# Patient Record
Sex: FEMALE | Race: WHITE | NOT HISPANIC OR LATINO
[De-identification: names, ages, dates, MRNs, and addresses within clinical notes are randomized per-mention and may not be internally consistent; named-entity substitution may affect disease eponyms.]

---

## 2020-06-29 PROBLEM — Z00.00 ENCOUNTER FOR PREVENTIVE HEALTH EXAMINATION: Status: ACTIVE | Noted: 2020-06-29

## 2020-07-02 ENCOUNTER — APPOINTMENT (OUTPATIENT)
Dept: GASTROENTEROLOGY | Facility: CLINIC | Age: 60
End: 2020-07-02
Payer: COMMERCIAL

## 2020-07-02 VITALS
SYSTOLIC BLOOD PRESSURE: 122 MMHG | WEIGHT: 118 LBS | TEMPERATURE: 98.4 F | BODY MASS INDEX: 21.71 KG/M2 | DIASTOLIC BLOOD PRESSURE: 74 MMHG | HEIGHT: 62 IN

## 2020-07-02 DIAGNOSIS — Z72.89 OTHER PROBLEMS RELATED TO LIFESTYLE: ICD-10-CM

## 2020-07-02 DIAGNOSIS — Z87.891 PERSONAL HISTORY OF NICOTINE DEPENDENCE: ICD-10-CM

## 2020-07-02 DIAGNOSIS — Z80.0 FAMILY HISTORY OF MALIGNANT NEOPLASM OF DIGESTIVE ORGANS: ICD-10-CM

## 2020-07-02 PROCEDURE — 99213 OFFICE O/P EST LOW 20 MIN: CPT

## 2020-07-02 NOTE — HISTORY OF PRESENT ILLNESS
[FreeTextEntry1] : patient a retired , is here to discuss having a colonoscopy.\par \par Mom had colon polyp, malignant, and it was managed colonoscopically and followed up without recurrence..\par \par for this patient has been followed, and her three daughters have been followed with periodic colonoscopy\par as For Joslyn, she has been advised to have colons every three to five years.\par \par patient;s two sisters have had neg colonoscopies..\par \par normal bowel function,\par \par sometimes stools are marble like,  slight straining ...

## 2020-07-14 ENCOUNTER — APPOINTMENT (OUTPATIENT)
Dept: GASTROENTEROLOGY | Facility: CLINIC | Age: 60
End: 2020-07-14
Payer: COMMERCIAL

## 2020-07-14 VITALS
SYSTOLIC BLOOD PRESSURE: 104 MMHG | WEIGHT: 118 LBS | HEART RATE: 63 BPM | BODY MASS INDEX: 21.71 KG/M2 | OXYGEN SATURATION: 98 % | DIASTOLIC BLOOD PRESSURE: 70 MMHG | HEIGHT: 62 IN | TEMPERATURE: 98.4 F

## 2020-07-14 DIAGNOSIS — D50.9 IRON DEFICIENCY ANEMIA, UNSPECIFIED: ICD-10-CM

## 2020-07-14 DIAGNOSIS — Z80.0 FAMILY HISTORY OF MALIGNANT NEOPLASM OF DIGESTIVE ORGANS: ICD-10-CM

## 2020-07-14 DIAGNOSIS — K59.09 OTHER CONSTIPATION: ICD-10-CM

## 2020-07-14 PROCEDURE — 99214 OFFICE O/P EST MOD 30 MIN: CPT

## 2020-07-14 NOTE — ASSESSMENT
[FreeTextEntry1] : 1.  CHANGE IN BOWEL FUNCTION RECENTLY, WITH HARDER STOOL, DIFFICULTY PASSING STOOL, MARBLE SHAPED STOOLS,\par 2.  PATIENT HAS BEEN RECENTLY TOLD SHE WAS IRON DEFICIENT, AND DR HALEY WANTED HER TO DO HEMOCCULT SLIDES, BUT MAGUI WAS UNABLE TO OBTAIN A SPECIMEN\par 3.  WE NEED TO DO COLONOSCOPY

## 2020-07-14 NOTE — PHYSICAL EXAM
[General Appearance - In No Acute Distress] : in no acute distress [General Appearance - Alert] : alert [Abdomen Soft] : soft [Abdomen Mass (___ Cm)] : no abdominal mass palpated [Abdomen Tenderness] : non-tender [] : no hepato-splenomegaly [Normal Sphincter Tone] : normal sphincter tone [No Rectal Mass] : no rectal mass [Internal Hemorrhoid] : no internal hemorrhoids [External Hemorrhoid] : no external hemorrhoids [FreeTextEntry1] : NO STOOL IN RECTAL AMPULLA

## 2020-07-14 NOTE — HISTORY OF PRESENT ILLNESS
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

## 2020-07-20 ENCOUNTER — RESULT REVIEW (OUTPATIENT)
Age: 60
End: 2020-07-20

## 2020-07-22 ENCOUNTER — RESULT REVIEW (OUTPATIENT)
Age: 60
End: 2020-07-22

## 2020-07-23 ENCOUNTER — APPOINTMENT (OUTPATIENT)
Dept: GASTROENTEROLOGY | Facility: HOSPITAL | Age: 60
End: 2020-07-23

## 2020-08-07 ENCOUNTER — RESULT CHARGE (OUTPATIENT)
Age: 60
End: 2020-08-07

## 2020-08-10 LAB
DATE COLLECTED: NORMAL
HEMOCCULT 2: NEGATIVE
HEMOCCULT 3: NEGATIVE
HEMOCCULT SP1 STL QL: NEGATIVE
QUALITY CONTROL: YES

## 2020-09-01 ENCOUNTER — APPOINTMENT (OUTPATIENT)
Dept: GASTROENTEROLOGY | Facility: CLINIC | Age: 60
End: 2020-09-01

## 2020-10-23 ENCOUNTER — APPOINTMENT (OUTPATIENT)
Dept: GASTROENTEROLOGY | Facility: CLINIC | Age: 60
End: 2020-10-23

## 2023-03-07 ENCOUNTER — APPOINTMENT (OUTPATIENT)
Dept: SURGERY | Facility: CLINIC | Age: 63
End: 2023-03-07
Payer: COMMERCIAL

## 2023-03-07 ENCOUNTER — NON-APPOINTMENT (OUTPATIENT)
Age: 63
End: 2023-03-07

## 2023-03-07 ENCOUNTER — APPOINTMENT (OUTPATIENT)
Dept: SURGERY | Facility: CLINIC | Age: 63
End: 2023-03-07

## 2023-03-07 VITALS
DIASTOLIC BLOOD PRESSURE: 60 MMHG | BODY MASS INDEX: 21.16 KG/M2 | OXYGEN SATURATION: 98 % | WEIGHT: 115 LBS | HEIGHT: 62 IN | HEART RATE: 68 BPM | RESPIRATION RATE: 18 BRPM | SYSTOLIC BLOOD PRESSURE: 98 MMHG

## 2023-03-07 PROCEDURE — 99203 OFFICE O/P NEW LOW 30 MIN: CPT | Mod: 25

## 2023-03-07 PROCEDURE — 46600 DIAGNOSTIC ANOSCOPY SPX: CPT

## 2023-03-07 RX ORDER — FLUOXETINE HYDROCHLORIDE 20 MG/1
20 CAPSULE ORAL
Refills: 0 | Status: DISCONTINUED | COMMUNITY
End: 2021-03-07

## 2023-03-07 NOTE — ASSESSMENT
[FreeTextEntry1] : 62-year-old female with full-thickness rectal prolapse. \par \par  Patient currently has good bowel function without evidence of fecal incontinence.  No evidence of anterior compartment prolapse.  Urinary incontinence treated with bulking injection.  Some evidence of delayed colonic motility with 1 bowel movement per week.  Recommend patient follow-up with Dr. Mckeon to discuss management of colonic motility and constipation.  Discussed operative approaches to repairing rectal prolapse including both abdominal and perineal approaches.  Given patient is young and otherwise healthy would recommend an abdominal approach with robotic assisted suture rectopexy versus ventral mesh rectopexy.  Discussed the potential risks and benefits of all approaches.  Recommend patient complete further work-up with Dr. Mckeon and then we can discuss further the best surgical options for her.\par \par Recommend follow-up after seeing Dr. Mckeon and having her colonoscopy completed.

## 2023-03-07 NOTE — HISTORY OF PRESENT ILLNESS
[FreeTextEntry1] : 62-year-old female here for initial evaluation for rectal prolapse.  Patient previously known to Dr. Mckeon.  She was recently seen in the ER 2 weeks ago for recurrent symptoms of something coming out with bowel movements.  She was at home she felt something prolapse that would not go back and which is what prompted her trip to the ER.  It was reduced in the ER and she was told that she had rectal prolapse.  No prior surgeries for prolapse.  No prior history of pregnancy or childbirth.  Patient has longstanding history of constipation with bowel movements once a week although she does not feel that she has significant straining.  She has never taken any medications or stool softeners to aid in bowel movements.  She is only taken some figs or prunes in the past.  She has previously had a colonoscopy with Dr. Mckeon in 2020 and is due for one again this summer.  She currently has follow-up scheduled with him in April. \par \par Medical history significant for iron deficiency anemia.  Patient previously treated for urinary incontinence with an injectable bulking agent.  Subsequently had some difficulty with urination but this is improved with time.  No issues with fecal incontinence.  No sensation of vaginal prolapse

## 2023-03-07 NOTE — PHYSICAL EXAM
[Abdomen Masses] : No abdominal masses [Abdomen Tenderness] : ~T No ~M abdominal tenderness [Normal rectal exam] : exam was normal [Excoriation] : no perianal excoriation [Tender, Swollen] : nontender, non-swollen [Skin Tags] : there were no residual hemorrhoidal skin tags seen [None] : there was no rectal abscess [JVD] : no jugular venous distention  [Respiratory Effort] : normal respiratory effort [No Rash or Lesion] : No rash or lesion [Calm] : calm [de-identified] : Sphincter tone slightly lax [de-identified] : No acute distress [FreeTextEntry1] : Patient asked to sit on the toilet and straining.  When she felt that the prolapse was out full-thickness, prolapse was visualized and appreciated.

## 2023-03-07 NOTE — PROCEDURE
[FreeTextEntry1] : Anoscopy: Anoscopic exam performed with lighted anoscope.  Internal hemorrhoids relatively flat without any active bleeding.  No other anorectal mucosal abnormalities noted.  Patient Toller procedure without issue

## 2023-04-12 ENCOUNTER — APPOINTMENT (OUTPATIENT)
Dept: GASTROENTEROLOGY | Facility: CLINIC | Age: 63
End: 2023-04-12
Payer: COMMERCIAL

## 2023-04-12 DIAGNOSIS — K58.9 IRRITABLE BOWEL SYNDROME W/OUT DIARRHEA: ICD-10-CM

## 2023-04-12 DIAGNOSIS — R19.8 OTHER SPECIFIED SYMPTOMS AND SIGNS INVOLVING THE DIGESTIVE SYSTEM AND ABDOMEN: ICD-10-CM

## 2023-04-12 DIAGNOSIS — K62.3 RECTAL PROLAPSE: ICD-10-CM

## 2023-04-12 DIAGNOSIS — D50.9 IRON DEFICIENCY ANEMIA, UNSPECIFIED: ICD-10-CM

## 2023-04-12 PROCEDURE — 99214 OFFICE O/P EST MOD 30 MIN: CPT | Mod: 95

## 2023-04-12 NOTE — HISTORY OF PRESENT ILLNESS
[FreeTextEntry1] : This is a telehealth visit audio and visual for Joslyn.  Several things have recently occurred.\par 1.  Increasing irregularity of her bowel movements, sometimes with straining, sometimes with a distinct narrowing of the stool.\par 2.  She also has very recently developed a rectal prolapse which she had never experienced before, not constant, but occurring when she is most To be straining at her bowel.\par With the straining and no rectal prolapse,she is looking for bleeding which can be a consequence of this\par 3.  She has a family history of colon cancer and that her mother had presented with colon cancer localized and successfully treated when she was 62 years old\par \par There is no cardiovascular disease and since he has recently undergone a cardiovascular work-up\par She is otherwise been in good health and the only main medications that she is taking have included Lexapro Lexapro very recently started and also an occasional Xanax.  The latter is usually for sleep

## 2023-04-12 NOTE — ASSESSMENT
[FreeTextEntry1] : 1.  patient has had recent change in bowel function\par \par 2.  in addition, she has recently presented to the ER with a bulging sensation, which turned out to be rectal mucosal prolapse, and she was referred back to me by Dr Fleming the colo rectal surgeon for further evaluation and colonoscopy, as we decide what to do with the prolapse.\par \par 3.  in any event, a high fiber diet is discussed and may be helpful, to reduce the degree of prolapse, any irregularity, and any rectal bleeding\par \par i agree with dr Fleming, colonoscopy is necessary at this time as we decide how to manage things going forward, especially with patient s mother having had colon cancer at age sixty\par \par finally patient has had fe deficiency anemia, and this always concerns me as we never determined an etiology\par \par

## 2023-04-14 ENCOUNTER — APPOINTMENT (OUTPATIENT)
Dept: GASTROENTEROLOGY | Facility: HOSPITAL | Age: 63
End: 2023-04-14

## 2023-04-14 ENCOUNTER — TRANSCRIPTION ENCOUNTER (OUTPATIENT)
Age: 63
End: 2023-04-14

## 2023-04-14 ENCOUNTER — NON-APPOINTMENT (OUTPATIENT)
Age: 63
End: 2023-04-14

## 2023-11-14 ENCOUNTER — APPOINTMENT (OUTPATIENT)
Dept: GASTROENTEROLOGY | Facility: CLINIC | Age: 63
End: 2023-11-14